# Patient Record
Sex: FEMALE | Race: WHITE | HISPANIC OR LATINO | URBAN - METROPOLITAN AREA
[De-identification: names, ages, dates, MRNs, and addresses within clinical notes are randomized per-mention and may not be internally consistent; named-entity substitution may affect disease eponyms.]

---

## 2017-08-04 ENCOUNTER — ALLSCRIPTS OFFICE VISIT (OUTPATIENT)
Dept: OTHER | Facility: OTHER | Age: 22
End: 2017-08-04

## 2017-08-29 ENCOUNTER — GENERIC CONVERSION - ENCOUNTER (OUTPATIENT)
Dept: OTHER | Facility: OTHER | Age: 22
End: 2017-08-29

## 2018-01-13 NOTE — MISCELLANEOUS
Provider Comments  Provider Comments:   Our records indicate that you missed an appointment on 08/04/2017  If you have not done so already, please call our office and we will be happy to schedule another appointment for you  If you must cancel your appointment, our office policy requires you to call our office at least 24 hours in advance so that we may utilize your appointment time for another patient who requires our services  If you have any questions, please contact our office at [phone]           Signatures   Electronically signed by : Joey Grijalva MD; Aug 29 2017  8:24AM EST                       (Author)

## 2018-01-14 NOTE — PROGRESS NOTES
Assessment    1  Encounter for preventive health examination (V70 0) (Z00 00)   2  Drug testing, pre-employment (V70 5) (Z02 1)    Plan  Drug testing, pre-employment    · (1) DRUG ABUSE SCREEN, URINE ROUTINE; Status:Active; Requested  for:47Pcm7517; Health Maintenance    · Follow-up visit in 1 year Evaluation and Treatment  Follow-up  Status: Hold For -  Scheduling  Requested for: 06Znz4921   · Always use a seat belt and shoulder strap when riding or driving a motor vehicle ;  Status:Complete;   Done: 08Xkz9520   · Brush your teeth 3 times a day and floss at least once a day ; Status:Complete;   Done:  79Ivz7348   · Drink plenty of fluids ; Status:Complete;   Done: 12XCM0768   · Eat a low fat and low cholesterol diet ; Status:Complete;   Done: 40UVU6376   · Eat foods that are high in calcium ; Status:Complete;   Done: 88Gtp0370   · Use a sun block product with an SPF of 15 or more ; Status:Complete;   Done:  54UCK5745   · Vitamins can help you get daily requirements that your diet may not be giving you ;  Status:Complete;   Done: 60Olh7912   · We recommend regular contraceptive use to prevent an unplanned pregnancy ;  Status:Complete;   Done: 52KMD7438   · We recommend routine visits to a dentist ; Status:Complete;   Done: 71TMS2852   · We recommend that you bring your body mass index down to 26 ; Status:Complete;    Done: 13AQI8558   · Call [phone] if: You find a new or different kind of lump in your breast ;  Status:Complete;   Done: 20LMR2657   · Call [phone] if: You have any warning signs of skin cancer ; Status:Complete;    Done: 30CGC1815    Discussion/Summary  health maintenance visit Currently, she eats an adequate diet and has an adequate exercise regimen   the risks and benefits of cervical cancer screening were discussed cervical cancer screening is current cervical cancer screening is needed every three years Breast cancer screening: the risks and benefits of breast cancer screening were discussed and breast cancer screening is not indicated  Osteoporosis screening: the risks and benefits of osteoporosis screening were discussed and bone mineral density testing is not indicated  She was advised to be evaluated by an ophthalmologist and a dentist  Advice and education were given regarding nutrition, weight loss, calcium supplements, vitamin D supplements, reproductive health, contraception, sunscreen use, self skin examination, helmet use and seat belt use  Patient discussion: discussed with the patient  Chief Complaint  Patient presents to office for a health maintenance exam       History of Present Illness  HM, Adult Female: The patient is being seen for a health maintenance evaluation  The last health maintenance visit was year(s) ago  General Health: The patient's health since the last visit is described as good  She does not have regular dental visits  She complains of vision problems  Vision care includes wearing soft contact lenses and an eye examination within the last year  She denies hearing loss  Immunizations status: not up to date  Lifestyle:  She does not have a healthy diet  She does not have any weight concerns  She exercises regularly  She does not use tobacco  She denies alcohol use  She denies drug use  Reproductive health:  she reports normal menses  Menstrual history:  age at menarche was 13  LMP: the last menstrual period was july 2016  The cycles are irregular  she uses no contraception  she is sexually active  she denies prior pregnancies  Screening: cancer screening reviewed and updated  metabolic screening reviewed and updated  risk screening reviewed and updated  HPI: seeing Endocrinologist in [address] [place] [address] every 6 months          Review of Systems    Constitutional: No fever, no chills, feels well, no tiredness, no recent weight gain or weight loss     Eyes: No complaints of eye pain, no red eyes, no eyesight problems, no discharge, no dry eyes, no itching of eyes  ENT: no complaints of earache, no loss of hearing, no nose bleeds, no nasal discharge, no sore throat, no hoarseness  Cardiovascular: No complaints of slow heart rate, no fast heart rate, no chest pain, no palpitations, no leg claudication, no lower extremity edema  Respiratory: No complaints of shortness of breath, no wheezing, no cough, no SOB on exertion, no orthopnea, no PND  Gastrointestinal: No complaints of abdominal pain, no constipation, no nausea or vomiting, no diarrhea, no bloody stools  Genitourinary: No complaints of dysuria, no incontinence, no pelvic pain, no dysmenorrhea, no vaginal discharge or bleeding  Musculoskeletal: No complaints of arthralgias, no myalgias, no joint swelling or stiffness, no limb pain or swelling  Integumentary: No complaints of skin rash or lesions, no itching, no skin wounds, no breast pain or lump  Neurological: No complaints of headache, no confusion, no convulsions, no numbness, no dizziness or fainting, no tingling, no limb weakness, no difficulty walking  Psychiatric: Not suicidal, no sleep disturbance, no anxiety or depression, no change in personality, no emotional problems  Endocrine: No complaints of proptosis, no hot flashes, no muscle weakness, no deepening of the voice, no feelings of weakness  Hematologic/Lymphatic: No complaints of swollen glands, no swollen glands in the neck, does not bleed easily, does not bruise easily  Active Problems    1  Lightheadedness (780 4) (R42)   2  Pituitary adenoma (227 3) (D35 2)   3   Shortness of breath (786 05) (R06 02)    Past Medical History    · History of chest pain (V13 89) (A29 430)    Surgical History    · Denied: History Of Prior Surgery    Family History  Mother    · No pertinent family history  Paternal Grandmother    · Family history of Breast cancer (174 9) (C52 919)  Maternal Grandfather    · Family history of Stroke (434 91) (I63 9)  Aunt    · Family history of Myocardial infarction (410 90) (I21 3)    Social History    · Never a smoker   · No alcohol use    Current Meds   1  No Reported Medications  Requested for: 70Vpf8392 Recorded    Allergies    1  Penicillins    2  No Known Food Allergies    Vitals   Recorded: 37LPL6031 05:87CY   Systolic 992   Diastolic 90   Heart Rate 80   Respiration 16   Height 5 ft 3 86 in   Weight 174 lb 3 2 oz   BMI Calculated 30 03   BSA Calculated 1 84     Physical Exam    Constitutional   General appearance: No acute distress, well appearing and well nourished  Head and Face   Head and face: Normal     Palpation of the face and sinuses: No sinus tenderness  Eyes   Conjunctiva and lids: No swelling, erythema or discharge  Pupils and irises: Equal, round, reactive to light  Ophthalmoscopic examination: Normal fundi and optic discs  Ears, Nose, Mouth, and Throat   External inspection of ears and nose: Normal     Otoscopic examination: Tympanic membranes translucent with normal light reflex  Canals patent without erythema  Hearing: Normal     Nasal mucosa, septum, and turbinates: Normal without edema or erythema  Lips, teeth, and gums: Normal, good dentition  Oropharynx: Normal with no erythema, edema, exudate or lesions  Neck   Neck: Supple, symmetric, trachea midline, no masses  Thyroid: Normal, no thyromegaly  Pulmonary   Respiratory effort: No increased work of breathing or signs of respiratory distress  Percussion of chest: Normal     Auscultation of lungs: Clear to auscultation  Cardiovascular   Palpation of heart: Normal PMI, no thrills  Auscultation of heart: Normal rate and rhythm, normal S1 and S2, no murmurs  Examination of extremities for edema and/or varicosities: Normal     Chest   Chest: Normal     Abdomen   Abdomen: Non-tender, no masses  Liver and spleen: No hepatomegaly or splenomegaly  Examination for hernias: No hernia appreciated      Lymphatic   Palpation of lymph nodes in neck: No lymphadenopathy  Palpation of lymph nodes in axillae: No lymphadenopathy  Palpation of lymph nodes in groin: No lymphadenopathy  Musculoskeletal   Gait and station: Normal     Digits and nails: Normal without clubbing or cyanosis  Joints, bones, and muscles: Normal     Range of motion: Normal     Stability: Normal     Muscle strength/tone: Normal     Skin   Skin and subcutaneous tissue: Normal without rashes or lesions  Palpation of skin and subcutaneous tissue: Normal turgor  Neurologic   Cranial nerves: Cranial nerves II-XII intact  Cortical function: Normal mental status  Reflexes: 2+ and symmetric  Sensation: No sensory loss  Coordination: Normal finger to nose and heel to shin  Psychiatric   Judgment and insight: Normal     Orientation to person, place, and time: Normal     Recent and remote memory: Intact  Mood and affect: Normal        Results/Data  PHQ-2 Adult Depression Screening 36Uok3683 09:09AM User, Ahs     Test Name Result Flag Reference   PHQ-2 Adult Depression Score 0     Over the last two weeks, how often have you been bothered by any of the following problems?   Little interest or pleasure in doing things: Not at all - 0  Feeling down, depressed, or hopeless: Not at all - 0   PHQ-2 Adult Depression Screening Negative         Signatures   Electronically signed by : Felipe Queen MD; Aug 26 2016  9:48AM EST                       (Author)

## 2018-01-15 NOTE — RESULT NOTES
Verified Results  (1) DRUG ABUSE SCREEN, URINE ROUTINE 20Vmr4874 09:49PM Melissa Iniguez [place] Order Number: [id number]     Test Name Result Flag Reference   AMPHETAMINE SCREEN URINE Negative ng/mL  Xkkqdw=1545   Amphetamine test includes Amphetamine and Methamphetamine  BARBITURATE SCREEN URINE Negative ng/mL  Hdnqmc=731   BENZODIAZEPINE SCREEN, URINE Negative ng/mL  Hedmtz=703   CANNABINOID SCREEN URINE Negative ng/mL  Cutoff=50   COCAINE(METAB  )SCREEN, URINE Negative ng/mL  Isrvof=016   METHADONE SCREEN, URINE Negative ng/mL  Xemico=163   OPIATE SCREEN URINE Negative ng/mL  Ujvapb=817   Opiate test includes Codeine and Morphine only  PHENCYCLIDINE (PCP), QUAL, UR Negative ng/mL  Cutoff=25   PROPOXYPHENE, SCREEN Negative ng/mL  Zbzhau=120   Performing Comments: 9 panels drug screen      Performed at:  [facility] [address]  [id number]  : Oanh De León MD, Phone:  [phone]       Discussion/Summary   her urine drug screen test came back all negative   she may  a copy for her school or mail it to her     - Dr Luis Aguayo   Electronically signed by : Tessie Melgoza MD; Aug 29 2016  7:39AM EST                       (Author)

## 2018-01-18 NOTE — MISCELLANEOUS
Provider Comments  Provider Comments:   Our records indicate that you missed an appointment on 08/04/2017  If you have not done so already, please call our office and we will be happy to schedule another appointment for you  If you must cancel your appointment, our office policy requires you to call our office at least 24 hours in advance so that we may utilize your appointment time for another patient who requires our services  If you have any questions, please contact our office at [phone]  Signatures   Electronically signed by : MEÑO Shah;  Aug 29 2017  9:22AM EST                       (Author)